# Patient Record
Sex: MALE | Race: BLACK OR AFRICAN AMERICAN | NOT HISPANIC OR LATINO | Employment: FULL TIME | URBAN - METROPOLITAN AREA
[De-identification: names, ages, dates, MRNs, and addresses within clinical notes are randomized per-mention and may not be internally consistent; named-entity substitution may affect disease eponyms.]

---

## 2020-10-23 ENCOUNTER — LAB VISIT (OUTPATIENT)
Dept: LAB | Facility: HOSPITAL | Age: 30
End: 2020-10-23
Attending: FAMILY MEDICINE
Payer: COMMERCIAL

## 2020-10-23 ENCOUNTER — OFFICE VISIT (OUTPATIENT)
Dept: INTERNAL MEDICINE | Facility: CLINIC | Age: 30
End: 2020-10-23
Payer: COMMERCIAL

## 2020-10-23 DIAGNOSIS — K21.9 GASTROESOPHAGEAL REFLUX DISEASE, UNSPECIFIED WHETHER ESOPHAGITIS PRESENT: ICD-10-CM

## 2020-10-23 DIAGNOSIS — Z00.00 ROUTINE ADULT HEALTH MAINTENANCE: ICD-10-CM

## 2020-10-23 DIAGNOSIS — F43.29 STRESS AND ADJUSTMENT REACTION: ICD-10-CM

## 2020-10-23 DIAGNOSIS — Z00.00 ROUTINE ADULT HEALTH MAINTENANCE: Primary | ICD-10-CM

## 2020-10-23 DIAGNOSIS — M72.2 PLANTAR FASCIA SYNDROME: ICD-10-CM

## 2020-10-23 LAB
ALBUMIN SERPL BCP-MCNC: 4.5 G/DL (ref 3.5–5.2)
ALP SERPL-CCNC: 69 U/L (ref 55–135)
ALT SERPL W/O P-5'-P-CCNC: 26 U/L (ref 10–44)
ANION GAP SERPL CALC-SCNC: 11 MMOL/L (ref 8–16)
AST SERPL-CCNC: 23 U/L (ref 10–40)
BASOPHILS # BLD AUTO: 0.04 K/UL (ref 0–0.2)
BASOPHILS NFR BLD: 0.3 % (ref 0–1.9)
BILIRUB SERPL-MCNC: 0.6 MG/DL (ref 0.1–1)
BUN SERPL-MCNC: 19 MG/DL (ref 6–20)
CALCIUM SERPL-MCNC: 9.7 MG/DL (ref 8.7–10.5)
CHLORIDE SERPL-SCNC: 103 MMOL/L (ref 95–110)
CHOLEST SERPL-MCNC: 230 MG/DL (ref 120–199)
CHOLEST/HDLC SERPL: 3.8 {RATIO} (ref 2–5)
CO2 SERPL-SCNC: 26 MMOL/L (ref 23–29)
CREAT SERPL-MCNC: 1.1 MG/DL (ref 0.5–1.4)
DIFFERENTIAL METHOD: ABNORMAL
EOSINOPHIL # BLD AUTO: 0 K/UL (ref 0–0.5)
EOSINOPHIL NFR BLD: 0.1 % (ref 0–8)
ERYTHROCYTE [DISTWIDTH] IN BLOOD BY AUTOMATED COUNT: 12.6 % (ref 11.5–14.5)
EST. GFR  (AFRICAN AMERICAN): >60 ML/MIN/1.73 M^2
EST. GFR  (NON AFRICAN AMERICAN): >60 ML/MIN/1.73 M^2
GLUCOSE SERPL-MCNC: 96 MG/DL (ref 70–110)
HCT VFR BLD AUTO: 48.4 % (ref 40–54)
HDLC SERPL-MCNC: 61 MG/DL (ref 40–75)
HDLC SERPL: 26.5 % (ref 20–50)
HGB BLD-MCNC: 15.9 G/DL (ref 14–18)
IMM GRANULOCYTES # BLD AUTO: 0.05 K/UL (ref 0–0.04)
IMM GRANULOCYTES NFR BLD AUTO: 0.4 % (ref 0–0.5)
LDLC SERPL CALC-MCNC: 155.2 MG/DL (ref 63–159)
LYMPHOCYTES # BLD AUTO: 3 K/UL (ref 1–4.8)
LYMPHOCYTES NFR BLD: 21.2 % (ref 18–48)
MCH RBC QN AUTO: 30.1 PG (ref 27–31)
MCHC RBC AUTO-ENTMCNC: 32.9 G/DL (ref 32–36)
MCV RBC AUTO: 92 FL (ref 82–98)
MONOCYTES # BLD AUTO: 1.2 K/UL (ref 0.3–1)
MONOCYTES NFR BLD: 8.5 % (ref 4–15)
NEUTROPHILS # BLD AUTO: 9.9 K/UL (ref 1.8–7.7)
NEUTROPHILS NFR BLD: 69.5 % (ref 38–73)
NONHDLC SERPL-MCNC: 169 MG/DL
NRBC BLD-RTO: 0 /100 WBC
PLATELET # BLD AUTO: 389 K/UL (ref 150–350)
PMV BLD AUTO: 10.8 FL (ref 9.2–12.9)
POTASSIUM SERPL-SCNC: 4.4 MMOL/L (ref 3.5–5.1)
PROT SERPL-MCNC: 8.1 G/DL (ref 6–8.4)
RBC # BLD AUTO: 5.29 M/UL (ref 4.6–6.2)
SODIUM SERPL-SCNC: 140 MMOL/L (ref 136–145)
TRIGL SERPL-MCNC: 69 MG/DL (ref 30–150)
WBC # BLD AUTO: 14.22 K/UL (ref 3.9–12.7)

## 2020-10-23 PROCEDURE — 80053 COMPREHEN METABOLIC PANEL: CPT

## 2020-10-23 PROCEDURE — 99999 PR PBB SHADOW E&M-NEW PATIENT-LVL III: CPT | Mod: PBBFAC,,, | Performed by: FAMILY MEDICINE

## 2020-10-23 PROCEDURE — 36415 COLL VENOUS BLD VENIPUNCTURE: CPT | Mod: PO

## 2020-10-23 PROCEDURE — 99203 OFFICE O/P NEW LOW 30 MIN: CPT | Mod: S$GLB,,, | Performed by: FAMILY MEDICINE

## 2020-10-23 PROCEDURE — 99203 PR OFFICE/OUTPT VISIT, NEW, LEVL III, 30-44 MIN: ICD-10-PCS | Mod: S$GLB,,, | Performed by: FAMILY MEDICINE

## 2020-10-23 PROCEDURE — 85025 COMPLETE CBC W/AUTO DIFF WBC: CPT

## 2020-10-23 PROCEDURE — 3008F PR BODY MASS INDEX (BMI) DOCUMENTED: ICD-10-PCS | Mod: CPTII,S$GLB,, | Performed by: FAMILY MEDICINE

## 2020-10-23 PROCEDURE — 99999 PR PBB SHADOW E&M-NEW PATIENT-LVL III: ICD-10-PCS | Mod: PBBFAC,,, | Performed by: FAMILY MEDICINE

## 2020-10-23 PROCEDURE — 3008F BODY MASS INDEX DOCD: CPT | Mod: CPTII,S$GLB,, | Performed by: FAMILY MEDICINE

## 2020-10-23 PROCEDURE — 80061 LIPID PANEL: CPT

## 2020-10-23 RX ORDER — MELOXICAM 15 MG/1
15 TABLET ORAL DAILY
Qty: 14 TABLET | Refills: 0 | Status: SHIPPED | OUTPATIENT
Start: 2020-10-23 | End: 2020-11-09 | Stop reason: SDUPTHER

## 2020-10-23 RX ORDER — ESCITALOPRAM OXALATE 10 MG/1
10 TABLET ORAL DAILY
Qty: 30 TABLET | Refills: 6 | Status: SHIPPED | OUTPATIENT
Start: 2020-10-23 | End: 2020-11-24

## 2020-10-26 ENCOUNTER — TELEPHONE (OUTPATIENT)
Dept: INTERNAL MEDICINE | Facility: CLINIC | Age: 30
End: 2020-10-26

## 2020-10-26 DIAGNOSIS — D72.829 LEUKOCYTOSIS, UNSPECIFIED TYPE: Primary | ICD-10-CM

## 2020-10-29 VITALS
BODY MASS INDEX: 26.25 KG/M2 | OXYGEN SATURATION: 99 % | DIASTOLIC BLOOD PRESSURE: 88 MMHG | WEIGHT: 177.25 LBS | TEMPERATURE: 99 F | SYSTOLIC BLOOD PRESSURE: 128 MMHG | HEART RATE: 81 BPM | HEIGHT: 69 IN

## 2020-10-29 NOTE — PROGRESS NOTES
Subjective:      Patient ID: Steven Thompson Jr. is a 30 y.o. male.    Chief Complaint: Foot Pain      Patient here for routine physical as well as to establish care.  He reports pain in both heels, this has been going on for a long time now, reports earlier this week had injections an urgent care clinic which did help, diagnosed with plantar fasciitis.    Reports increased stressors recently both at home and at work, finds he is exercising less recently as well, having difficulty sleeping, feeling overwhelmed, increased irritability, withdrawing his usual activities.  He denies any dysphoria.  Also reports reflux    Foot Pain  Associated symptoms include abdominal pain and arthralgias. Pertinent negatives include no fatigue, fever or rash.     Review of Systems   Constitutional: Negative for activity change, appetite change, fatigue and fever.   Respiratory: Negative for stridor.    Cardiovascular: Negative for palpitations and leg swelling.   Gastrointestinal: Positive for abdominal pain. Negative for constipation and diarrhea.   Musculoskeletal: Positive for arthralgias and gait problem.   Skin: Negative for color change and rash.   Psychiatric/Behavioral: Positive for decreased concentration and sleep disturbance. Negative for dysphoric mood. The patient is nervous/anxious.      History reviewed. No pertinent past medical history.       History reviewed. No pertinent surgical history.  History reviewed. No pertinent family history.  Social History     Socioeconomic History    Marital status: Single     Spouse name: Not on file    Number of children: Not on file    Years of education: Not on file    Highest education level: Not on file   Occupational History    Not on file   Social Needs    Financial resource strain: Not on file    Food insecurity     Worry: Not on file     Inability: Not on file    Transportation needs     Medical: Not on file     Non-medical: Not on file   Tobacco Use    Smoking  "status: Current Every Day Smoker    Smokeless tobacco: Never Used   Substance and Sexual Activity    Alcohol use: Not Currently    Drug use: Not Currently    Sexual activity: Yes     Partners: Female   Lifestyle    Physical activity     Days per week: Not on file     Minutes per session: Not on file    Stress: Not on file   Relationships    Social connections     Talks on phone: Not on file     Gets together: Not on file     Attends Congregation service: Not on file     Active member of club or organization: Not on file     Attends meetings of clubs or organizations: Not on file     Relationship status: Not on file   Other Topics Concern    Not on file   Social History Narrative    Not on file     Review of patient's allergies indicates:  No Known Allergies    Objective:       /88   Pulse 81   Temp 99.3 °F (37.4 °C) (Tympanic)   Ht 5' 9" (1.753 m)   Wt 80.4 kg (177 lb 4 oz)   SpO2 99%   BMI 26.18 kg/m²   Physical Exam  Vitals signs and nursing note reviewed.   Constitutional:       General: He is not in acute distress.     Appearance: Normal appearance. He is well-developed. He is not ill-appearing or diaphoretic.   HENT:      Head: Normocephalic.      Right Ear: Hearing, tympanic membrane, ear canal and external ear normal.      Left Ear: Hearing, tympanic membrane, ear canal and external ear normal.      Nose: Nose normal.      Right Sinus: No maxillary sinus tenderness or frontal sinus tenderness.      Left Sinus: No maxillary sinus tenderness or frontal sinus tenderness.      Mouth/Throat:      Pharynx: Uvula midline. No oropharyngeal exudate.   Eyes:      Conjunctiva/sclera: Conjunctivae normal.      Pupils: Pupils are equal, round, and reactive to light.   Neck:      Musculoskeletal: Normal range of motion and neck supple.   Cardiovascular:      Rate and Rhythm: Normal rate and regular rhythm.   Pulmonary:      Effort: Pulmonary effort is normal. No respiratory distress.      Breath sounds: " Normal breath sounds.   Abdominal:      General: Bowel sounds are normal.      Palpations: Abdomen is soft.      Tenderness: There is no abdominal tenderness. There is no guarding.      Hernia: No hernia is present.   Musculoskeletal: Normal range of motion.      Comments: Low arches, tenderness at insertion of plantar fascia   Skin:     General: Skin is warm and dry.      Capillary Refill: Capillary refill takes less than 2 seconds.   Neurological:      General: No focal deficit present.      Mental Status: He is alert and oriented to person, place, and time.   Psychiatric:         Mood and Affect: Mood normal.         Behavior: Behavior normal.         Thought Content: Thought content normal.         Judgment: Judgment normal.       Assessment:     1. Routine adult health maintenance    2. Gastroesophageal reflux disease, unspecified whether esophagitis present    3. Stress and adjustment reaction    4. Plantar fascia syndrome      Plan:   Routine adult health maintenance  -     Comprehensive Metabolic Panel; Future; Expected date: 04/21/2021  -     CBC auto differential; Future; Expected date: 04/21/2021  -     Lipid Panel; Future; Expected date: 10/23/2020    Gastroesophageal reflux disease, unspecified whether esophagitis present    Stress and adjustment reaction    Plantar fascia syndrome    Other orders  -     escitalopram oxalate (LEXAPRO) 10 MG tablet; Take 1 tablet (10 mg total) by mouth once daily.  Dispense: 30 tablet; Refill: 6  -     meloxicam (MOBIC) 15 MG tablet; Take 1 tablet (15 mg total) by mouth once daily.  Dispense: 14 tablet; Refill: 0     Gave stretching exercises plantar fasciitis.  He will if pain persists.  Is to follow-up 2 weeks for follow-up on new medication Lexapro  Medication List with Changes/Refills   New Medications    ESCITALOPRAM OXALATE (LEXAPRO) 10 MG TABLET    Take 1 tablet (10 mg total) by mouth once daily.    MELOXICAM (MOBIC) 15 MG TABLET    Take 1 tablet (15 mg total) by  mouth once daily.

## 2020-11-05 ENCOUNTER — LAB VISIT (OUTPATIENT)
Dept: LAB | Facility: HOSPITAL | Age: 30
End: 2020-11-05
Attending: FAMILY MEDICINE
Payer: COMMERCIAL

## 2020-11-05 DIAGNOSIS — D72.829 LEUKOCYTOSIS, UNSPECIFIED TYPE: ICD-10-CM

## 2020-11-05 LAB
BASOPHILS # BLD AUTO: 0.06 K/UL (ref 0–0.2)
BASOPHILS NFR BLD: 0.4 % (ref 0–1.9)
DIFFERENTIAL METHOD: ABNORMAL
EOSINOPHIL # BLD AUTO: 0.1 K/UL (ref 0–0.5)
EOSINOPHIL NFR BLD: 0.7 % (ref 0–8)
ERYTHROCYTE [DISTWIDTH] IN BLOOD BY AUTOMATED COUNT: 12.9 % (ref 11.5–14.5)
HCT VFR BLD AUTO: 44.2 % (ref 40–54)
HGB BLD-MCNC: 14.4 G/DL (ref 14–18)
IMM GRANULOCYTES # BLD AUTO: 0.06 K/UL (ref 0–0.04)
IMM GRANULOCYTES NFR BLD AUTO: 0.4 % (ref 0–0.5)
LYMPHOCYTES # BLD AUTO: 3.3 K/UL (ref 1–4.8)
LYMPHOCYTES NFR BLD: 24.4 % (ref 18–48)
MCH RBC QN AUTO: 30 PG (ref 27–31)
MCHC RBC AUTO-ENTMCNC: 32.6 G/DL (ref 32–36)
MCV RBC AUTO: 92 FL (ref 82–98)
MONOCYTES # BLD AUTO: 1 K/UL (ref 0.3–1)
MONOCYTES NFR BLD: 7.4 % (ref 4–15)
NEUTROPHILS # BLD AUTO: 9 K/UL (ref 1.8–7.7)
NEUTROPHILS NFR BLD: 66.7 % (ref 38–73)
NRBC BLD-RTO: 0 /100 WBC
PLATELET # BLD AUTO: 394 K/UL (ref 150–350)
PMV BLD AUTO: 10.2 FL (ref 9.2–12.9)
RBC # BLD AUTO: 4.8 M/UL (ref 4.6–6.2)
WBC # BLD AUTO: 13.55 K/UL (ref 3.9–12.7)

## 2020-11-05 PROCEDURE — 36415 COLL VENOUS BLD VENIPUNCTURE: CPT | Mod: PO

## 2020-11-05 PROCEDURE — 85025 COMPLETE CBC W/AUTO DIFF WBC: CPT

## 2020-11-06 ENCOUNTER — TELEPHONE (OUTPATIENT)
Dept: INTERNAL MEDICINE | Facility: CLINIC | Age: 30
End: 2020-11-06

## 2020-11-06 DIAGNOSIS — D72.829 LEUKOCYTOSIS, UNSPECIFIED TYPE: Primary | ICD-10-CM

## 2020-11-06 NOTE — TELEPHONE ENCOUNTER
----- Message from Nusrat Carrillo MD sent at 11/6/2020  4:15 PM CST -----  Please notify his white count is still elevated but improved. I wanted to recheck again in 2 more weeks.

## 2020-11-06 NOTE — TELEPHONE ENCOUNTER
SPOKE WITH PT INFORM OF LAB RESULTS REMAIN UP BUT IMPROVING; PT VERBALIZED UNDERSTANDING AND AGREED TO f/u LAB APPT ON 11/20/20 ; APPT BOOKED

## 2020-11-09 ENCOUNTER — HOSPITAL ENCOUNTER (EMERGENCY)
Facility: HOSPITAL | Age: 30
Discharge: HOME | End: 2020-11-09
Attending: EMERGENCY MEDICINE
Payer: COMMERCIAL

## 2020-11-09 ENCOUNTER — OFFICE VISIT (OUTPATIENT)
Dept: INTERNAL MEDICINE | Facility: CLINIC | Age: 30
End: 2020-11-09
Payer: COMMERCIAL

## 2020-11-09 ENCOUNTER — OFFICE VISIT (OUTPATIENT)
Dept: PODIATRY | Facility: CLINIC | Age: 30
End: 2020-11-09
Payer: COMMERCIAL

## 2020-11-09 ENCOUNTER — HOSPITAL ENCOUNTER (OUTPATIENT)
Dept: RADIOLOGY | Facility: HOSPITAL | Age: 30
Discharge: HOME | End: 2020-11-09
Attending: FAMILY MEDICINE
Payer: COMMERCIAL

## 2020-11-09 VITALS
BODY MASS INDEX: 27.3 KG/M2 | WEIGHT: 184.31 LBS | OXYGEN SATURATION: 99 % | DIASTOLIC BLOOD PRESSURE: 87 MMHG | HEART RATE: 78 BPM | RESPIRATION RATE: 16 BRPM | SYSTOLIC BLOOD PRESSURE: 131 MMHG | HEIGHT: 69 IN | TEMPERATURE: 99 F

## 2020-11-09 VITALS
TEMPERATURE: 98 F | BODY MASS INDEX: 27.62 KG/M2 | WEIGHT: 186.5 LBS | HEART RATE: 71 BPM | RESPIRATION RATE: 20 BRPM | OXYGEN SATURATION: 98 % | HEIGHT: 69 IN | DIASTOLIC BLOOD PRESSURE: 84 MMHG | SYSTOLIC BLOOD PRESSURE: 130 MMHG

## 2020-11-09 DIAGNOSIS — M76.822 POSTERIOR TIBIAL TENDON DYSFUNCTION (PTTD) OF BOTH LOWER EXTREMITIES: Primary | ICD-10-CM

## 2020-11-09 DIAGNOSIS — M76.61 ACHILLES TENDINITIS OF RIGHT LOWER EXTREMITY: ICD-10-CM

## 2020-11-09 DIAGNOSIS — M21.42 PES PLANUS OF BOTH FEET: ICD-10-CM

## 2020-11-09 DIAGNOSIS — M72.2 PLANTAR FASCIITIS: ICD-10-CM

## 2020-11-09 DIAGNOSIS — M76.60 ACHILLES TENDINITIS, UNSPECIFIED LATERALITY: ICD-10-CM

## 2020-11-09 DIAGNOSIS — M21.41 PES PLANUS OF BOTH FEET: ICD-10-CM

## 2020-11-09 DIAGNOSIS — M76.821 POSTERIOR TIBIAL TENDON DYSFUNCTION (PTTD) OF BOTH LOWER EXTREMITIES: Primary | ICD-10-CM

## 2020-11-09 DIAGNOSIS — M76.61 ACHILLES TENDINITIS OF RIGHT LOWER EXTREMITY: Primary | ICD-10-CM

## 2020-11-09 DIAGNOSIS — M72.2 PLANTAR FASCIA SYNDROME: Primary | ICD-10-CM

## 2020-11-09 PROCEDURE — 99213 OFFICE O/P EST LOW 20 MIN: CPT | Mod: S$GLB,,, | Performed by: FAMILY MEDICINE

## 2020-11-09 PROCEDURE — 99213 PR OFFICE/OUTPT VISIT, EST, LEVL III, 20-29 MIN: ICD-10-PCS | Mod: S$GLB,,, | Performed by: FAMILY MEDICINE

## 2020-11-09 PROCEDURE — 99999 PR PBB SHADOW E&M-EST. PATIENT-LVL III: CPT | Mod: PBBFAC,,, | Performed by: PODIATRIST

## 2020-11-09 PROCEDURE — 99999 PR PBB SHADOW E&M-EST. PATIENT-LVL IV: CPT | Mod: PBBFAC,,, | Performed by: FAMILY MEDICINE

## 2020-11-09 PROCEDURE — 99283 EMERGENCY DEPT VISIT LOW MDM: CPT | Mod: 25

## 2020-11-09 PROCEDURE — 99243 PR OFFICE CONSULTATION,LEVEL III: ICD-10-PCS | Mod: S$GLB,,, | Performed by: PODIATRIST

## 2020-11-09 PROCEDURE — 99999 PR PBB SHADOW E&M-EST. PATIENT-LVL IV: ICD-10-PCS | Mod: PBBFAC,,, | Performed by: FAMILY MEDICINE

## 2020-11-09 PROCEDURE — 3008F PR BODY MASS INDEX (BMI) DOCUMENTED: ICD-10-PCS | Mod: CPTII,S$GLB,, | Performed by: FAMILY MEDICINE

## 2020-11-09 PROCEDURE — 73630 X-RAY EXAM OF FOOT: CPT | Mod: TC,PO,RT

## 2020-11-09 PROCEDURE — 99999 PR PBB SHADOW E&M-EST. PATIENT-LVL III: ICD-10-PCS | Mod: PBBFAC,,, | Performed by: PODIATRIST

## 2020-11-09 PROCEDURE — 99243 OFF/OP CNSLTJ NEW/EST LOW 30: CPT | Mod: S$GLB,,, | Performed by: PODIATRIST

## 2020-11-09 PROCEDURE — 73630 X-RAY EXAM OF FOOT: CPT | Mod: 26,RT,, | Performed by: RADIOLOGY

## 2020-11-09 PROCEDURE — 73630 XR FOOT COMPLETE 3 VIEW RIGHT: ICD-10-PCS | Mod: 26,RT,, | Performed by: RADIOLOGY

## 2020-11-09 PROCEDURE — 3008F BODY MASS INDEX DOCD: CPT | Mod: CPTII,S$GLB,, | Performed by: FAMILY MEDICINE

## 2020-11-09 RX ORDER — HYDROCODONE BITARTRATE AND ACETAMINOPHEN 5; 325 MG/1; MG/1
1 TABLET ORAL EVERY 4 HOURS PRN
Qty: 11 TABLET | Refills: 0 | Status: SHIPPED | OUTPATIENT
Start: 2020-11-09 | End: 2020-11-14

## 2020-11-09 RX ORDER — MELOXICAM 15 MG/1
15 TABLET ORAL DAILY
Qty: 14 TABLET | Refills: 0 | Status: SHIPPED | OUTPATIENT
Start: 2020-11-09 | End: 2020-12-09 | Stop reason: ALTCHOICE

## 2020-11-09 NOTE — PROGRESS NOTES
Subjective:      Patient ID: Steven Thompson Jr. is a 30 y.o. male.    Chief Complaint: Patient in with a complaint of pain in his feet (Patient states he wants to see if he can get a referral to see a foot dr and states he has knot on the back of his right foot. Patient states he was seen at [facility] on [address] this am . )      Patient reports recent significant worsening of pain in his feet, right worse than left.  He completed NSAID prescribed last visit.  He reports he went to the ER this morning because the pain was so severe.  He is requesting referral to podiatrist.  Reports anxiety also also addressed last visit unchanged, took Lexapro only 1 day did not like how it made him feel    Review of Systems   Musculoskeletal: Positive for arthralgias and gait problem.   Psychiatric/Behavioral: The patient is nervous/anxious.      History reviewed. No pertinent past medical history.       History reviewed. No pertinent surgical history.  History reviewed. No pertinent family history.  Social History     Socioeconomic History    Marital status: Single     Spouse name: Not on file    Number of children: Not on file    Years of education: Not on file    Highest education level: Not on file   Occupational History    Not on file   Social Needs    Financial resource strain: Not on file    Food insecurity     Worry: Not on file     Inability: Not on file    Transportation needs     Medical: Not on file     Non-medical: Not on file   Tobacco Use    Smoking status: Current Every Day Smoker     Types: Cigarettes    Smokeless tobacco: Never Used   Substance and Sexual Activity    Alcohol use: Not Currently    Drug use: Not Currently    Sexual activity: Yes     Partners: Female   Lifestyle    Physical activity     Days per week: Not on file     Minutes per session: Not on file    Stress: Not on file   Relationships    Social connections     Talks on phone: Not on file     Gets together: Not on file     Attends  "Scientologist service: Not on file     Active member of club or organization: Not on file     Attends meetings of clubs or organizations: Not on file     Relationship status: Not on file   Other Topics Concern    Not on file   Social History Narrative    Not on file     Review of patient's allergies indicates:  No Known Allergies    Objective:       /84   Pulse 71   Temp 97.6 °F (36.4 °C)   Resp 20   Ht 5' 9" (1.753 m)   Wt 84.6 kg (186 lb 8.2 oz)   SpO2 98%   BMI 27.54 kg/m²   Physical Exam  Constitutional:       General: He is not in acute distress.     Appearance: Normal appearance. He is well-developed. He is not ill-appearing or diaphoretic.   Musculoskeletal:         General: Tenderness present.      Comments: Right foot:  Tender at insertion of plantar fascia, on Achilles tendon   Left foot:  Tenderness at insertion of plantar fascia  Low arches bilaterally   Skin:     Capillary Refill: Capillary refill takes less than 2 seconds.   Neurological:      General: No focal deficit present.      Mental Status: He is alert and oriented to person, place, and time.   Psychiatric:         Mood and Affect: Mood normal.         Behavior: Behavior normal.         Thought Content: Thought content normal.         Judgment: Judgment normal.       Assessment:     1. Plantar fascia syndrome    2. Achilles tendinitis, unspecified laterality      Plan:   Plantar fascia syndrome  -     Ambulatory referral/consult to Podiatry; Future; Expected date: 11/16/2020    Achilles tendinitis, unspecified laterality  -     Ambulatory referral/consult to Podiatry; Future; Expected date: 11/16/2020  -     X-Ray Foot Complete 3 view Right; Future; Expected date: 11/09/2020    Other orders  -     meloxicam (MOBIC) 15 MG tablet; Take 1 tablet (15 mg total) by mouth once daily.  Dispense: 14 tablet; Refill: 0    xray - NAF  will extend meloxicam  Medication List with Changes/Refills   Current Medications    ESCITALOPRAM OXALATE " (LEXAPRO) 10 MG TABLET    Take 1 tablet (10 mg total) by mouth once daily.    HYDROCODONE-ACETAMINOPHEN (NORCO) 5-325 MG PER TABLET    Take 1 tablet by mouth every 4 (four) hours as needed for Pain.   Changed and/or Refilled Medications    Modified Medication Previous Medication    MELOXICAM (MOBIC) 15 MG TABLET meloxicam (MOBIC) 15 MG tablet       Take 1 tablet (15 mg total) by mouth once daily.    Take 1 tablet (15 mg total) by mouth once daily.

## 2020-11-09 NOTE — LETTER
November 9, 2020      Nusrat Carrillo MD  [address] [address] [address]  Phone: [phone]  Fax: [phone]          Patient: Steven Thompson Jr.   MR Number: [id number]   YOB: 1990   Date of Visit: 11/9/2020       Dear Dr. Nusrat Carrillo:    Thank you for referring Steven Thompson to me for evaluation. Attached you will find relevant portions of my assessment and plan of care.    If you have questions, please do not hesitate to call me. I look forward to following Steven Thompson along with you.    Sincerely,    Ivy Adam, SUSHILA    Enclosure  CC:  No Recipients    If you would like to receive this communication electronically, please contact [email or web address] or [phone] to request more information on [employer] access.    For providers and/or their staff who would like to refer a patient to [facility], please contact us through our one-stop-shop provider referral line, [facility], at [phone].    If you feel you have received this communication in error or would no longer like to receive these types of communications, please e-mail [email or web address]

## 2020-11-09 NOTE — ED PROVIDER NOTES
SCRIBE #1 NOTE: I, Jimbo Rika, am scribing for, and in the presence of, Yordy Lezama MD. I have scribed the entire note.      History      Chief Complaint   Patient presents with    Foot Pain     bilateral foot pain, has seen MD and told he had planters fasciitis and given medication, but states pain is still there.       Review of patient's allergies indicates:  No Known Allergies     HPI   HPI    11/9/2020, 7:59 AM   History obtained from the patient      History of Present Illness: Steven Thompson Jr. is a 30 y.o. male patient who presents to the Emergency Department for bilateral foot pain, onset 1.5 months PTA. Pt recently presented to [facility], was dx with plantar fasciitis, and started on Meloxicam 15 mg. Symptoms are constant and moderate in severity. No mitigating or exacerbating factors reported. No associated sxs reported. Patient denies any fever, chills, n/v/d, SOB, CP, weakness, numbness, dizziness, headache, and all other sxs at this time. Pt has not yet followed with Podiatry. No further complaints or concerns at this time.     Arrival mode: Personal vehicle    PCP: Nusrat Carrillo MD       Past Medical History:  No past medical history on file.    Past Surgical History:  No past surgical history on file.      Family History:  No family history on file.    Social History:  Social History     Tobacco Use    Smoking status: Current Every Day Smoker    Smokeless tobacco: Never Used   Substance and Sexual Activity    Alcohol use: Not Currently    Drug use: Not Currently    Sexual activity: Yes     Partners: Female       ROS   Review of Systems   Constitutional: Negative for chills and fever.   HENT: Negative for sore throat.    Respiratory: Negative for shortness of breath.    Cardiovascular: Negative for chest pain.   Gastrointestinal: Negative for diarrhea, nausea and vomiting.   Genitourinary: Negative for dysuria.   Musculoskeletal: Positive for myalgias (bilateral  "feet). Negative for back pain.   Skin: Negative for rash.   Neurological: Negative for dizziness, seizures, weakness, light-headedness, numbness and headaches.   Hematological: Does not bruise/bleed easily.   All other systems reviewed and are negative.    Physical Exam      Initial Vitals [11/09/20 0744]   BP Pulse Resp Temp SpO2   131/87 78 16 98.6 °F (37 °C) 99 %      MAP       --          Physical Exam  Nursing Notes and Vital Signs Reviewed.  Constitutional: Patient is in no acute distress. Well-developed and well-nourished.  Head: Atraumatic. Normocephalic.  Eyes: PERRL. EOM intact. Conjunctivae are not pale. No scleral icterus.  ENT: Mucous membranes are moist. Oropharynx is clear and symmetric.    Neck: Supple. Full ROM. No lymphadenopathy.  Cardiovascular: Regular rate. Regular rhythm. No murmurs, rubs, or gallops. Distal pulses are 2+ and symmetric.  Pulmonary/Chest: No respiratory distress. Clear to auscultation bilaterally. No wheezing or rales.  Abdominal: Soft and non-distended.  There is no tenderness.  No rebound, guarding, or rigidity.   Musculoskeletal: Moves all extremities. No obvious deformities. R Achilles tendon swelling with TTP. Intact.  Tenderness along the bilateral plantar fascia  Skin: Warm and dry.  Neurological:  Alert, awake, and appropriate.  Normal speech.  No acute focal neurological deficits are appreciated.  Psychiatric: Normal affect. Good eye contact. Appropriate in content.    ED Course    Procedures  ED Vital Signs:  Vitals:    11/09/20 0744   BP: 131/87   Pulse: 78   Resp: 16   Temp: 98.6 °F (37 °C)   TempSrc: Oral   SpO2: 99%   Weight: 83.6 kg (184 lb 4.9 oz)   Height: 5' 9" (1.753 m)            The Emergency Provider reviewed the vital signs and test results, which are outlined above.    ED Discussion     8:01 AM: Discussed with pt all pertinent ED information. Discussed pt dx and plan of tx. Gave pt all f/u and return to the ED instructions. All questions and concerns " were addressed at this time. Pt expresses understanding of information and instructions, and is comfortable with plan to discharge. Pt is stable for discharge.    I discussed with patient and/or family/caretaker that evaluation in the ED does not suggest any emergent or life threatening medical conditions requiring immediate intervention beyond what was provided in the ED, and I believe patient is safe for discharge.  Regardless, an unremarkable evaluation in the ED does not preclude the development or presence of a serious of life threatening condition. As such, patient was instructed to return immediately for any worsening or change in current symptoms.         ED Medication(s):  Medications - No data to display    Follow-up Information     Nusrat Carrillo MD.    Specialty: Internal Medicine  Contact information:  [address] [facility] [id number] [phone]                  New Prescriptions    HYDROCODONE-ACETAMINOPHEN (NORCO) 5-325 MG PER TABLET    Take 1 tablet by mouth every 4 (four) hours as needed for Pain.         Medical Decision Making              Scribe Attestation:   Scribe #1: I performed the above scribed service and the documentation accurately describes the services I performed. I attest to the accuracy of the note.    Attending:   Physician Attestation Statement for Scribe #1: I, Yordy Lezama MD, personally performed the services described in this documentation, as scribed by Jimbo Meléndez, in my presence, and it is both accurate and complete.          Clinical Impression       ICD-10-CM ICD-9-CM   1. Achilles tendinitis of right lower extremity  M76.61 726.71   2. Plantar fasciitis  M72.2 728.71       Disposition:   Disposition: Discharged  Condition: Stable         Yordy Lezama MD  11/09/20 [id number]

## 2020-11-09 NOTE — PROGRESS NOTES
Subjective:       Patient ID: Steven Thompson Jr. is a 30 y.o. male.    Chief Complaint: Foot Pain (bilateral pain to feet, 8/10 at present. Patient states it started since August 2020. He is wearing socks and tennis shoes. He is non diabetic)      HPI: Steven Thompson Jr. presents to the office with the chief complaint of pains to the left and right foot and ankle at the medial aspect.  Patient also complains of posterior ankle pain at the heel.  The pains are rated as 8/10 and are described as sore in nature. The pains have been on going now for the past several months and are worsening. The patient denies trauma. The patient states recent use of NSAIDs for management. The patient states that prolonged walking and standing exacerbates and causes the symptoms. The patient does state mild associated swelling as well. Patient's Primary Care Provider is Nusrat Carrillo MD.     Review of patient's allergies indicates:  No Known Allergies    History reviewed. No pertinent past medical history.    History reviewed. No pertinent family history.    Social History     Socioeconomic History    Marital status: Single     Spouse name: Not on file    Number of children: Not on file    Years of education: Not on file    Highest education level: Not on file   Occupational History    Not on file   Social Needs    Financial resource strain: Not on file    Food insecurity     Worry: Not on file     Inability: Not on file    Transportation needs     Medical: Not on file     Non-medical: Not on file   Tobacco Use    Smoking status: Current Every Day Smoker     Types: Cigarettes    Smokeless tobacco: Never Used   Substance and Sexual Activity    Alcohol use: Not Currently    Drug use: Not Currently    Sexual activity: Yes     Partners: Female   Lifestyle    Physical activity     Days per week: Not on file     Minutes per session: Not on file    Stress: Not on file   Relationships    Social connections      Talks on phone: Not on file     Gets together: Not on file     Attends Latter day service: Not on file     Active member of club or organization: Not on file     Attends meetings of clubs or organizations: Not on file     Relationship status: Not on file   Other Topics Concern    Not on file   Social History Narrative    Not on file       History reviewed. No pertinent surgical history.    Review of Systems   Constitutional: Negative for activity change, appetite change, chills and fever.   HENT: Negative for sinus pain, sore throat and voice change.    Eyes: Negative for pain, redness and visual disturbance.   Respiratory: Negative for cough and shortness of breath.    Cardiovascular: Negative for chest pain and palpitations.   Gastrointestinal: Negative for diarrhea, nausea and vomiting.   Musculoskeletal: Negative for back pain and joint swelling.   Skin: Negative for color change and wound.   Neurological: Negative for dizziness, weakness and numbness.   Psychiatric/Behavioral: The patient is not nervous/anxious.          Objective:   There were no vitals taken for this visit.    X-Ray Foot Complete 3 view Right  Narrative: EXAMINATION:  XR FOOT COMPLETE 3 VIEW RIGHT    CLINICAL HISTORY:  . Achilles tendinitis, unspecified leg    TECHNIQUE:  AP, lateral, and oblique views of the foot were performed.    COMPARISON:  None    FINDINGS:  There is no evidence to suggest acute fracture or dislocation.  The joint spaces appear to be well maintained.  A pes planus deformity is suspected even though the patient is nonweightbearing.  Impression: As above    Electronically signed by: Saul Montalvo DO  Date:    11/09/2020  Time:    11:02      Physical Exam    LOWER EXTREMITY PHYSICAL EXAMINATION    VASCULAR: Pulses are palpable to the B/L lower extremity. The right dorsalis pedis pulse is 2/4 and the posterior tibial pulse is 2/4. The left dorsalis pedis pulse is 2/4 and the posterior tibial pulse is 2/4. Hair growth is  noted on the dorsal foot and digits. Proximal to distal, warm to warm. Capillary refill time is WNL at less than 3s.    DERMATOLOGY: Skin is supple, dry and intact. No ecchymosis is noted. No hypertrophic skin formation. No erythema or cellulitis is noted.     ORTHOPEDIC: There is moderate collapsing pes planovalgus on the left and right foot. There is moderate tenderness to palpation of the navicular tuberosity on the left or right foot. There is no edema noted along the course of the PT tendon on the left or right foot. There is no retro-malleolar edema (medial malleolus). There is mild pain to palpation at the spring ligament and the deltoid ligaments. There is no apparent pains to palpation of the medial malleolus.  Equinus contracture is noted.  No pain with palpation and/or range of motion of the ankle joint.  There is no crepitus noted with range of motion of the ankle joint. The ankle is not in valgus.  There is no limitation to ROM of the STJ and the MTJ. The hindfoot is in valgus. Upon standing, there is moderate tommie-talar subluxation noted on the left foot. There is moderate forefoot/midfoot abduction on the hindfoot.  RCSP is valgus. The deformity is supple. The patient is able to double heel rise, but has difficulties with single heel rise on the left foot. Gait pattern is antalgic at this time.    NEUROLOGY: Protective sensation is intact via 5.07 Lucas Mert monofilament. Proprioception is intact. Sensation to light touch is intact. Upon palpation of the interspaces, there are no neurological sensations stated that radiate proximal or distal. Upon compression of the metatarsal heads from medial to lateral, no neurological sensations or symptoms are stated.    Assessment:     1. Posterior tibial tendon dysfunction (PTTD) of both lower extremities    2. Achilles tendinitis of right lower extremity    3. Pes planus of both feet        Plan:     Posterior tibial tendon dysfunction (PTTD) of both  lower extremities  -     Ambulatory referral/consult to Podiatry    Achilles tendinitis of right lower extremity  -     Ambulatory referral/consult to Podiatry    Pes planus of both feet        Insert plan discussion.  X-rays reviewed by myself with the patient the room.  Unfortunately, these are nonweightbearing x-rays as they provide little information about the patient's weight-bearing status or the ability to determine the degree a pes planus.  Patient also complaining of pain to the posterior aspect of the heel as well.  Discussed Achilles tendinitis.  Discussed the importance of stretching 4-6 times per day with a stretching band.  This will help alleviate tension being applied to the calcaneus where the Achilles tendon inserts.  Patient may also benefit from anti-inflammatories as previously prescribed by PCP.    Discussed pathology in etiology associated pes planus.  As the foot does tend to pronate, there is excessive tension being applied to the posterior tibial tendon medially which causes pain to the plantar aspect of foot.  Recommend patient consider over-the-counter orthotic therapy.  A prescription was provided to the patient for orthotics.  We discussed the importance of wearing the orthotics to help elevate the arch which will allow for tension to be lessened to the plantar fascia and posterior heel cord.  Discussed the importance of the break-in period with the inserts by wearing them 2-3 hours for the 1st day and increasing their use an hour each day until able to tolerate a full work period.    Also discussed possible surgical intervention if conservative options do not reduce patient's symptoms.  Will continue with conservative management thus far.    Patient follow-up 1.5 months      Future Appointments   Date Time Provider Department Center   11/20/2020  3:30 PM LABORATORY, [facility]   12/21/2020  2:40 PM Ivy Adam DPM [facility]

## 2020-11-09 NOTE — Clinical Note
"Steven"Paula Thompson was seen and treated in our emergency department on 11/9/2020.  He may return to work on 11/10/2020.       If you have any questions or concerns, please don't hesitate to call.      Yordy Lezama MD"

## 2020-11-20 ENCOUNTER — LAB VISIT (OUTPATIENT)
Dept: LAB | Facility: HOSPITAL | Age: 30
End: 2020-11-20
Attending: FAMILY MEDICINE
Payer: COMMERCIAL

## 2020-11-20 DIAGNOSIS — D72.829 LEUKOCYTOSIS, UNSPECIFIED TYPE: ICD-10-CM

## 2020-11-20 LAB
BASOPHILS # BLD AUTO: 0.06 K/UL (ref 0–0.2)
BASOPHILS NFR BLD: 0.6 % (ref 0–1.9)
DIFFERENTIAL METHOD: ABNORMAL
EOSINOPHIL # BLD AUTO: 0.2 K/UL (ref 0–0.5)
EOSINOPHIL NFR BLD: 1.8 % (ref 0–8)
ERYTHROCYTE [DISTWIDTH] IN BLOOD BY AUTOMATED COUNT: 12.8 % (ref 11.5–14.5)
HCT VFR BLD AUTO: 40.5 % (ref 40–54)
HGB BLD-MCNC: 13.4 G/DL (ref 14–18)
IMM GRANULOCYTES # BLD AUTO: 0.03 K/UL (ref 0–0.04)
IMM GRANULOCYTES NFR BLD AUTO: 0.3 % (ref 0–0.5)
LYMPHOCYTES # BLD AUTO: 2.9 K/UL (ref 1–4.8)
LYMPHOCYTES NFR BLD: 27.4 % (ref 18–48)
MCH RBC QN AUTO: 30 PG (ref 27–31)
MCHC RBC AUTO-ENTMCNC: 33.1 G/DL (ref 32–36)
MCV RBC AUTO: 91 FL (ref 82–98)
MONOCYTES # BLD AUTO: 0.7 K/UL (ref 0.3–1)
MONOCYTES NFR BLD: 6.8 % (ref 4–15)
NEUTROPHILS # BLD AUTO: 6.7 K/UL (ref 1.8–7.7)
NEUTROPHILS NFR BLD: 63.1 % (ref 38–73)
NRBC BLD-RTO: 0 /100 WBC
PLATELET # BLD AUTO: 365 K/UL (ref 150–350)
PMV BLD AUTO: 11.1 FL (ref 9.2–12.9)
RBC # BLD AUTO: 4.46 M/UL (ref 4.6–6.2)
WBC # BLD AUTO: 10.65 K/UL (ref 3.9–12.7)

## 2020-11-20 PROCEDURE — 85025 COMPLETE CBC W/AUTO DIFF WBC: CPT

## 2020-11-20 PROCEDURE — 36415 COLL VENOUS BLD VENIPUNCTURE: CPT | Mod: PO

## 2020-11-23 ENCOUNTER — TELEPHONE (OUTPATIENT)
Dept: INTERNAL MEDICINE | Facility: CLINIC | Age: 30
End: 2020-11-23

## 2020-11-23 NOTE — TELEPHONE ENCOUNTER
----- Message from Nusrat Carrillo MD sent at 11/21/2020  9:26 AM CST -----  Please let them know that his blood count back to normal.

## 2020-11-24 ENCOUNTER — OFFICE VISIT (OUTPATIENT)
Dept: INTERNAL MEDICINE | Facility: CLINIC | Age: 30
End: 2020-11-24
Payer: COMMERCIAL

## 2020-11-24 VITALS
WEIGHT: 181.69 LBS | TEMPERATURE: 98 F | BODY MASS INDEX: 26.91 KG/M2 | DIASTOLIC BLOOD PRESSURE: 82 MMHG | HEIGHT: 69 IN | SYSTOLIC BLOOD PRESSURE: 116 MMHG | HEART RATE: 78 BPM | OXYGEN SATURATION: 98 %

## 2020-11-24 DIAGNOSIS — M79.605 BILATERAL LEG PAIN: ICD-10-CM

## 2020-11-24 DIAGNOSIS — M79.604 BILATERAL LEG PAIN: ICD-10-CM

## 2020-11-24 DIAGNOSIS — M54.50 ACUTE LOW BACK PAIN, UNSPECIFIED BACK PAIN LATERALITY, UNSPECIFIED WHETHER SCIATICA PRESENT: Primary | ICD-10-CM

## 2020-11-24 PROCEDURE — 1125F PR PAIN SEVERITY QUANTIFIED, PAIN PRESENT: ICD-10-PCS | Mod: S$GLB,,, | Performed by: FAMILY MEDICINE

## 2020-11-24 PROCEDURE — 99999 PR PBB SHADOW E&M-EST. PATIENT-LVL IV: ICD-10-PCS | Mod: PBBFAC,,, | Performed by: FAMILY MEDICINE

## 2020-11-24 PROCEDURE — 3008F PR BODY MASS INDEX (BMI) DOCUMENTED: ICD-10-PCS | Mod: CPTII,S$GLB,, | Performed by: FAMILY MEDICINE

## 2020-11-24 PROCEDURE — 99213 OFFICE O/P EST LOW 20 MIN: CPT | Mod: S$GLB,,, | Performed by: FAMILY MEDICINE

## 2020-11-24 PROCEDURE — 1125F AMNT PAIN NOTED PAIN PRSNT: CPT | Mod: S$GLB,,, | Performed by: FAMILY MEDICINE

## 2020-11-24 PROCEDURE — 99999 PR PBB SHADOW E&M-EST. PATIENT-LVL IV: CPT | Mod: PBBFAC,,, | Performed by: FAMILY MEDICINE

## 2020-11-24 PROCEDURE — 3008F BODY MASS INDEX DOCD: CPT | Mod: CPTII,S$GLB,, | Performed by: FAMILY MEDICINE

## 2020-11-24 PROCEDURE — 99213 PR OFFICE/OUTPT VISIT, EST, LEVL III, 20-29 MIN: ICD-10-PCS | Mod: S$GLB,,, | Performed by: FAMILY MEDICINE

## 2020-11-24 RX ORDER — CYCLOBENZAPRINE HCL 10 MG
10 TABLET ORAL NIGHTLY
Qty: 30 TABLET | Refills: 1 | Status: SHIPPED | OUTPATIENT
Start: 2020-11-24 | End: 2020-12-04

## 2020-11-27 ENCOUNTER — CLINICAL SUPPORT (OUTPATIENT)
Dept: REHABILITATION | Facility: HOSPITAL | Age: 30
End: 2020-11-27
Payer: COMMERCIAL

## 2020-11-27 DIAGNOSIS — M79.604 BILATERAL LEG PAIN: ICD-10-CM

## 2020-11-27 DIAGNOSIS — M79.605 BILATERAL LEG PAIN: ICD-10-CM

## 2020-11-27 DIAGNOSIS — M72.2 PLANTAR FASCIITIS, BILATERAL: Primary | ICD-10-CM

## 2020-11-27 DIAGNOSIS — M54.50 ACUTE LOW BACK PAIN, UNSPECIFIED BACK PAIN LATERALITY, UNSPECIFIED WHETHER SCIATICA PRESENT: ICD-10-CM

## 2020-11-27 PROCEDURE — 97014 ELECTRIC STIMULATION THERAPY: CPT

## 2020-11-27 PROCEDURE — 97161 PT EVAL LOW COMPLEX 20 MIN: CPT

## 2020-11-27 PROCEDURE — 97110 THERAPEUTIC EXERCISES: CPT

## 2020-11-27 NOTE — PLAN OF CARE
[facility]  Physical Therapy Initial Evaluation    Name: Steven Thompson Jr.  Clinic Number: [id number]    Therapy Diagnosis:   Encounter Diagnoses   Name Primary?    Acute low back pain, unspecified back pain laterality, unspecified whether sciatica present     Bilateral leg pain      Physician: Nusrat Carrillo MD    Physician Orders: PT Eval and Treat    Medical Diagnosis from Referral:   M54.5 (ICD-10-CM) - Acute low back pain, unspecified back pain laterality, unspecified whether sciatica present   M79.604,M79.605 (ICD-10-CM) - Bilateral leg pain   Evaluation Date: 11/27/2020  Authorization Period Expiration: 12/31/2020  Plan of Care Expiration: 2/5/2021  Visit # / Visits authorized: 1/ 20    Time In: 7:55 am  Time Out: 9:00 am   Total Billable Time: 25 minutes    Precautions: Standard     Subjective   Date of onset: feet  X 3 months / back x 2 weeks    History of current condition - Steven reports: he originally was having bilateral plantar foot pain for 3 months. Saw doctor and had inserts prescribed . Was given stretch strap to stretch feet .He started getting lower back pain over the past few weeks - unsure if it is related to feet or coincidental.      Pain:  Current 2/10, worst 8/10, best 2/10   Location: bilateral lower lumbar spine - extends down legs .  Foot pain bilaterally plantar surface    Description: Aching and Variable  Aggravating Factors: back- more active / moving around  Foot pain - worse with increased time weight bearing   Easing Factors: lying down , rest     Prior Therapy: none - went to chiro a few days ago ( helped some)   Social History:   lives with their family  Occupation:    Prior Level of Function: independent   Current Level of Function: modified independent    Imaging, x-ray of feet : no evidence to suggest acute fracture or dislocation.  The joint spaces appear to be well maintained.     Medical History:   No past medical history on  file.    Surgical History:   Steven Thompson Jr.  has no past surgical history on file.    Medications:   Steven has a current medication list which includes the following prescription(s): cyclobenzaprine and meloxicam.    Allergies:   Review of patient's allergies indicates:  No Known Allergies     Pts goals: Decreased pain . Able to walk pain free.     Objective           Posture/Structure:  Level / symmetrical trunk , notable pes planus bilaterally , difficulty with heel raise     Gait: mild guarded posture and shortened stride from expected.     L/sp AROM:   % Pain Present (Y/N)   FB To ankle level  Y   RSB 75%    LSB 75% Y   BB 50% Y   RRot 80%    LRot 80% Y     Ankle ROM:  dorsiflexion :  0 active /5 passive deg    plantarflexion: 45 active   Great Toe Extension :  70 deg    Hip - decreased extension       Strength:  Hip flexors  4/5 4/5  Quadriceps  5-/5 5-/5      Hamstrings  5-/5 5-/5     Anterior Tibialis 5-/5 5-/5     Peroneals  5-/5 5-/5     Hip External rotators 4/5 4/5     Gluteus Medius 4/5 4/5     Gluteus Jose 4/5 4/5       Special Test:    PKB     mild increased pain   Larry test:  Positive  Slump Test:  Negative  SLR Test:  Negative   Quadrant:  Pain left  Lumbar Ext/rotation Positive            PIVM Lumbar: relative hypermobility lower LSp      Palpation: tender through plantar fascia , particularly at insertion, tender to palpation of Achilles bilaterally    Tender in lower lumbar paraspinals  With increased tension in same     TREATMENT   Treatment Time In: 7:55 am   Treatment Time Out: 9:00 am   Total Treatment time separate from Evaluation: 25 minutes    Steven received therapeutic exercises to develop strength and ROM for 10 minutes including:  Heel raises with hold ( ball in heels for inversion moment)   Prayer stretch   Psoas stretch ( larry test style)     Steven received the following manual therapy techniques: Joint mobilizations were applied to the: ankle and lower back for 0  minutes, including:  -    Steven received the following supervised modalities after being cleared for contradictions: IFC Electrical Stimulation:  Steven received IFC Electrical Stimulation for pain control applied to the lower back . Pt received stimulation at 40 % scan at a frequency of 150Hz for 15 minutes. Steven tolderated treatment well without any adverse effects.      Steven received hot pack for 15 minutes to lumbar spine with heat.    Home Exercises and Patient Education Provided    Education provided:   -Education on condition, HEP, and plan of care     Written Home Exercises Provided: yes.  Exercises were reviewed and Steven was able to demonstrate them prior to the end of the session.  Steven demonstrated good  understanding of the education provided.     See EMR under Patient Instructions for exercises provided 11/27/2020.    Assessment   Steven is a 30 y.o. male referred to outpatient Physical Therapy with a medical diagnosis of acute lower back pain and leg pain. He is also being seen for plantar foot pain. Pt presents with severe pes planus leading to increased stress on the structures / tissues of the foot and recently started wearing inserts to address such. The change in inserts/ footwear may have contributed to some increased back pain. He has some hip flexor tightness and increased mobility at the lowest levels of the lumbar spine with a likely extension rotation syndrome / increased stress at the lumbosacral junction. There are no signs of true radiculopathy.     Pt prognosis is Good.   Pt will benefit from skilled outpatient Physical Therapy to address the deficits stated above and in the chart below, provide pt/family education, and to maximize pt's level of independence.     Plan of care discussed with patient: Yes  Pt's spiritual, cultural and educational needs considered and patient is agreeable to the plan of care and goals as stated below:     Anticipated Barriers for therapy: chronicity and  severity of pes planus     Medical Necessity is demonstrated by the following  History  Co-morbidities and personal factors that may impact the plan of care Co-morbidities:   level of undertstanding of current condition and pes planus     Personal Factors:   age     low   Examination  Body Structures and Functions, activity limitations and participation restrictions that may impact the plan of care Body Regions:   back  lower extremities    Body Systems:    ROM  strength  balance  gait    Participation Restrictions:   moderate    Activity limitations:   Learning and applying knowledge  no deficits    General Tasks and Commands  no deficits    Communication  no deficits    Mobility  lifting and carrying objects  walking    Self care  looking after one's health    Domestic Life  shopping  cooking  doing house work (cleaning house, washing dishes, laundry)    Interactions/Relationships  no deficits    Life Areas  employment    Community and Social Life  community life  recreation and leisure         low   Clinical Presentation evolving clinical presentation with changing clinical characteristics moderate   Decision Making/ Complexity Score: low     Goals:  Short Term Goals: In 4 weeks   1.I with HEP  2.Pt to increase lumbar ROM from current to full    3. Pt to report a subjective decrease in pain   4. Patient to demo increase ankle dorsiflexion ROM to 5 degrees active     Long Term Goals: In 10 weeks  1.Pt to score less than 20% impaired on the FOTO questionnaire  2. Patient to demo increase in LE strength to 5- or 5/5  3. Patient to have decreased pain to <1/10 at all times.  4. Patient to obtain orthotics that are comfortable and provide support to longitudinal arch  5. Patient to perform daily activities including ambulating community distances without limitation.      Plan   Plan of care Certification: 11/27/2020 to 2/5/2021    Outpatient Physical Therapy 2 times weekly for 10 weeks to include the following  interventions: Electrical Stimulation prn, Gait Training, Manual Therapy, Moist Heat/ Ice, Neuromuscular Re-ed, Patient Education, Self Care, Therapeutic Activites and Therapeutic Exercise.     Huang Ramirez, PT    Thank you for this referral.    These services are reasonable and necessary for the conditions set forth above while under my care.

## 2020-12-04 ENCOUNTER — TELEPHONE (OUTPATIENT)
Dept: INTERNAL MEDICINE | Facility: CLINIC | Age: 30
End: 2020-12-04

## 2020-12-04 ENCOUNTER — CLINICAL SUPPORT (OUTPATIENT)
Dept: REHABILITATION | Facility: HOSPITAL | Age: 30
End: 2020-12-04
Payer: COMMERCIAL

## 2020-12-04 DIAGNOSIS — M79.605 BILATERAL LEG PAIN: ICD-10-CM

## 2020-12-04 DIAGNOSIS — M79.604 BILATERAL LEG PAIN: ICD-10-CM

## 2020-12-04 DIAGNOSIS — M54.50 ACUTE LOW BACK PAIN, UNSPECIFIED BACK PAIN LATERALITY, UNSPECIFIED WHETHER SCIATICA PRESENT: Primary | ICD-10-CM

## 2020-12-04 DIAGNOSIS — M72.2 PLANTAR FASCIITIS, BILATERAL: ICD-10-CM

## 2020-12-04 PROCEDURE — 97110 THERAPEUTIC EXERCISES: CPT

## 2020-12-04 PROCEDURE — 97140 MANUAL THERAPY 1/> REGIONS: CPT

## 2020-12-04 NOTE — PROGRESS NOTES
Physical Therapy Treatment Note     Name: Steven Thompson Jr.  Clinic Number: [id number]    Therapy Diagnosis:   Encounter Diagnoses   Name Primary?    Acute low back pain, unspecified back pain laterality, unspecified whether sciatica present Yes    Bilateral leg pain     Plantar fasciitis, bilateral      Physician: Nusrat Carrillo MD    Visit Date: 12/4/2020    Physician Orders: PT Eval and Treat    Medical Diagnosis from Referral:   M54.5 (ICD-10-CM) - Acute low back pain, unspecified back pain laterality, unspecified whether sciatica present   M79.604,M79.605 (ICD-10-CM) - Bilateral leg pain   Evaluation Date: 11/27/2020  Authorization Period Expiration: 12/31/2020  Plan of Care Expiration: 2/5/2021  Visit # / Visits authorized: 2 / 20     Time In: 7:55 am  Time Out: 8:50 am   Total Billable Time: 50 minutes     Precautions: Standard       Subjective     Pt reports: he tolerated the evaluation well. Some soreness with exercises but not too bad. Still with tenderness and pain in plantar foot , described as worst around plantar fascial insertion. Worse in am and generally with weight bearing . He reports he is concerned with going back to longer hours working and standing in boots. He does have inserts that he is wearing but wonders if they are the right ones. Is not wearing today during visit.   He was compliant with home exercise program.  Response to previous treatment: good  Functional change: none to date    Pain: 2/10  Location: bilateral plantar feet       Objective     Steven received therapeutic exercises to develop strength and ROM for 25 minutes including:  Heel raises with hold ( ball in heels for inversion moment)   Seated soleus heel raise - 40# 3 x 15   Shuttle squats 6 bands 2 x 2 mins   Shuttle heel raise with hold 2 x 2 mins   gastroc and great toe stretch - passive   Psoas stretch ( rommel test style)      Steven received the following manual therapy techniques: Joint mobilizations  were applied to the: ankle and lower back for 20 minutes, including:  -talocrural distraction and talar posterior glide   - great toe extension mobs            Home Exercises Provided and Patient Education Provided     Education provided:   - reviewed / discussed plan of care and exercises . Discussed orthotics and use of such to assist in unloading tissue as well as discussed the pathology of plantar fasciitis and treatment approaches for such.     Written Home Exercises Provided: Patient instructed to cont prior HEP.  Exercises were reviewed and Steven was able to demonstrate them prior to the end of the session.  Steven demonstrated good  understanding of the education provided.     See EMR under Patient Instructions for exercises provided prior visit.    Assessment     Tolerated progression of loading exercise well with good effort but reports some pain with such. Pain subsides quickly after exercise.   Notable pes planus and hip flexor tightness and will benefit from continued progression of exercise to address such. Patient instructed to bring inserts next session for PT to evaluate and recommendations will be made at that time. Regardless of inserts he will need to increase intrinsic foot strength .   Steven is progressing well towards his goals.   Pt prognosis is Good.     Pt will continue to benefit from skilled outpatient physical therapy to address the deficits listed in the problem list box on initial evaluation, provide pt/family education and to maximize pt's level of independence in the home and community environment.     Pt's spiritual, cultural and educational needs considered and pt agreeable to plan of care and goals.     Anticipated barriers to physical therapy: chronicity and severity of pes planus    Goals:   Short Term Goals: In 4 weeks   1.I with HEP  2.Pt to increase lumbar ROM from current to full    3. Pt to report a subjective decrease in pain   4. Patient to demo increase ankle dorsiflexion  ROM to 5 degrees active      Long Term Goals: In 10 weeks  1.Pt to score less than 20% impaired on the FOTO questionnaire  2. Patient to demo increase in LE strength to 5- or 5/5  3. Patient to have decreased pain to <1/10 at all times.  4. Patient to obtain orthotics that are comfortable and provide support to longitudinal arch  5. Patient to perform daily activities including ambulating community distances without limitation.        Plan   Plan of care Certification: 11/27/2020 to 2/5/2021     Outpatient Physical Therapy 2 times weekly for 10 weeks to include the following interventions: Electrical Stimulation prn, Gait Training, Manual Therapy, Moist Heat/ Ice, Neuromuscular Re-ed, Patient Education, Self Care, Therapeutic Activites and Therapeutic Exercise.       Huang Ramirez, PT

## 2020-12-07 NOTE — PROGRESS NOTES
"  Physical Therapy Assistant Treatment Note     Name: Steven Thompson Jr.  Clinic Number: [id number]    Therapy Diagnosis:   Encounter Diagnoses   Name Primary?    Acute low back pain, unspecified back pain laterality, unspecified whether sciatica present Yes    Bilateral leg pain     Plantar fasciitis, bilateral      Physician: Nusrat Carrillo MD    Visit Date: 12/8/2020    Physician Orders: PT Eval and Treat    Medical Diagnosis from Referral:   M54.5 (ICD-10-CM) - Acute low back pain, unspecified back pain laterality, unspecified whether sciatica present   M79.604,M79.605 (ICD-10-CM) - Bilateral leg pain   Evaluation Date: 11/27/2020  Authorization Period Expiration: 12/31/2020  Plan of Care Expiration: 2/5/2021  Visit # / Visits authorized: 3 / 20     Time In: 7:35 am  Time Out: 8:20 am   Total Billable Time: 45 minutes     Precautions: Standard       Subjective     Pt reports: he has increased pain in low back and B feet worsening in the morning. He has been doing stretches prior to getting out of bed. He thinks shoe inserts have been contributing to increased low back pain. He will go back to work next Monday and will be standing and walking for 11 hours.  He was compliant with home exercise program.  Response to previous treatment: good   Functional change: none to date    Pain: 9/10  Location: bilateral plantar feet       Objective     Steven received therapeutic exercises to develop strength and ROM for 45 minutes including:  Heel raises with hold ( ball in heels for inversion moment)   Seated soleus heel raise - 40# 3 x 15   Shuttle squats 6 bands 2 x 2 mins   Shuttle heel raise with hold 2 x 2 mins   gastroc and soleus stretch 3x30"  Psoas stretch ( rommel test style)  Bridges 3x10  Side-lying SLR 2x10     Steven received the following manual therapy techniques: Joint mobilizations were applied to the: ankle and lower back for 0 minutes, including:  -talocrural distraction and talar posterior " glide   - great toe extension mobs      Steven received the following supervised modalities after being cleared for contradictions: IFC Electrical Stimulation:  Steven received IFC Electrical Stimulation for pain control applied to the lower back . Pt received stimulation at 40 % scan at a frequency of 150Hz for 10 minutes.        Steven received hot pack for 10 minutes to lumbar spine with heat.      Home Exercises Provided and Patient Education Provided     Education provided:   - reviewed / discussed plan of care and exercises . Discussed orthotics and use of such to assist in unloading tissue as well as discussed the pathology of plantar fasciitis and treatment approaches for such.    Written Home Exercises Provided: Patient instructed to cont prior HEP.  Exercises were reviewed and Steven was able to demonstrate them prior to the end of the session.  Steven demonstrated good  understanding of the education provided.     See EMR under Patient Instructions for exercises provided prior visit.    Assessment     Patient presents with reports of increased low back and bilateral foot pain. Performed additional hip strengthening exercises to increase core stability. He responded well to new exercises but difficulty with glut med strengthening. PT evaluated shoe inserts and made recommendations to less rigid insert. He reports decreased pain following exercise and modalities.    Steven is progressing well towards his goals.   Pt prognosis is Good.     Pt will continue to benefit from skilled outpatient physical therapy to address the deficits listed in the problem list box on initial evaluation, provide pt/family education and to maximize pt's level of independence in the home and community environment.     Pt's spiritual, cultural and educational needs considered and pt agreeable to plan of care and goals.     Anticipated barriers to physical therapy: chronicity and severity of pes planus    Goals:   Short Term Goals: In 4  weeks   1.I with HEP  2.Pt to increase lumbar ROM from current to full    3. Pt to report a subjective decrease in pain   4. Patient to demo increase ankle dorsiflexion ROM to 5 degrees active      Long Term Goals: In 10 weeks  1.Pt to score less than 20% impaired on the FOTO questionnaire  2. Patient to demo increase in LE strength to 5- or 5/5  3. Patient to have decreased pain to <1/10 at all times.  4. Patient to obtain orthotics that are comfortable and provide support to longitudinal arch  5. Patient to perform daily activities including ambulating community distances without limitation.        Plan   Plan of care Certification: 11/27/2020 to 2/5/2021     Outpatient Physical Therapy 2 times weekly for 10 weeks to include the following interventions: Electrical Stimulation prn, Gait Training, Manual Therapy, Moist Heat/ Ice, Neuromuscular Re-ed, Patient Education, Self Care, Therapeutic Activites and Therapeutic Exercise.       Elizabeth Lyles, PTA

## 2020-12-08 ENCOUNTER — CLINICAL SUPPORT (OUTPATIENT)
Dept: REHABILITATION | Facility: HOSPITAL | Age: 30
End: 2020-12-08
Payer: COMMERCIAL

## 2020-12-08 DIAGNOSIS — M79.605 BILATERAL LEG PAIN: ICD-10-CM

## 2020-12-08 DIAGNOSIS — M79.604 BILATERAL LEG PAIN: ICD-10-CM

## 2020-12-08 DIAGNOSIS — M72.2 PLANTAR FASCIITIS, BILATERAL: ICD-10-CM

## 2020-12-08 DIAGNOSIS — M54.50 ACUTE LOW BACK PAIN, UNSPECIFIED BACK PAIN LATERALITY, UNSPECIFIED WHETHER SCIATICA PRESENT: Primary | ICD-10-CM

## 2020-12-08 PROCEDURE — 97014 ELECTRIC STIMULATION THERAPY: CPT | Mod: CQ

## 2020-12-08 PROCEDURE — 97110 THERAPEUTIC EXERCISES: CPT | Mod: CQ

## 2020-12-09 ENCOUNTER — PATIENT OUTREACH (OUTPATIENT)
Dept: ADMINISTRATIVE | Facility: OTHER | Age: 30
End: 2020-12-09

## 2020-12-09 ENCOUNTER — OFFICE VISIT (OUTPATIENT)
Dept: PODIATRY | Facility: CLINIC | Age: 30
End: 2020-12-09
Payer: COMMERCIAL

## 2020-12-09 VITALS — BODY MASS INDEX: 26.42 KG/M2 | HEIGHT: 69 IN | WEIGHT: 178.38 LBS

## 2020-12-09 DIAGNOSIS — M72.2 PLANTAR FASCIITIS: ICD-10-CM

## 2020-12-09 DIAGNOSIS — M21.41 PES PLANUS OF BOTH FEET: ICD-10-CM

## 2020-12-09 DIAGNOSIS — M21.42 PES PLANUS OF BOTH FEET: ICD-10-CM

## 2020-12-09 DIAGNOSIS — M54.50 ACUTE BILATERAL LOW BACK PAIN, UNSPECIFIED WHETHER SCIATICA PRESENT: ICD-10-CM

## 2020-12-09 DIAGNOSIS — M76.61 ACHILLES TENDINITIS OF RIGHT LOWER EXTREMITY: Primary | ICD-10-CM

## 2020-12-09 PROCEDURE — 1125F AMNT PAIN NOTED PAIN PRSNT: CPT | Mod: S$GLB,,, | Performed by: PODIATRIST

## 2020-12-09 PROCEDURE — 1125F PR PAIN SEVERITY QUANTIFIED, PAIN PRESENT: ICD-10-PCS | Mod: S$GLB,,, | Performed by: PODIATRIST

## 2020-12-09 PROCEDURE — 99999 PR PBB SHADOW E&M-EST. PATIENT-LVL II: CPT | Mod: PBBFAC,,, | Performed by: PODIATRIST

## 2020-12-09 PROCEDURE — 99999 PR PBB SHADOW E&M-EST. PATIENT-LVL II: ICD-10-PCS | Mod: PBBFAC,,, | Performed by: PODIATRIST

## 2020-12-09 PROCEDURE — 99214 OFFICE O/P EST MOD 30 MIN: CPT | Mod: S$GLB,,, | Performed by: PODIATRIST

## 2020-12-09 PROCEDURE — 3008F BODY MASS INDEX DOCD: CPT | Mod: CPTII,S$GLB,, | Performed by: PODIATRIST

## 2020-12-09 PROCEDURE — 3008F PR BODY MASS INDEX (BMI) DOCUMENTED: ICD-10-PCS | Mod: CPTII,S$GLB,, | Performed by: PODIATRIST

## 2020-12-09 PROCEDURE — 99214 PR OFFICE/OUTPT VISIT, EST, LEVL IV, 30-39 MIN: ICD-10-PCS | Mod: S$GLB,,, | Performed by: PODIATRIST

## 2020-12-09 RX ORDER — IBUPROFEN 600 MG/1
600 TABLET ORAL 2 TIMES DAILY
Qty: 60 TABLET | Refills: 0 | Status: SHIPPED | OUTPATIENT
Start: 2020-12-09 | End: 2021-01-05 | Stop reason: SDUPTHER

## 2020-12-09 NOTE — LETTER
December 9, 2020      [facility]  [address]  [place] [address]  Phone: [phone]  Fax: [phone]       Patient: Steven Thompson   YOB: 1990  Date of Visit: 12/09/2020    To Whom It May Concern:    Ashley Thompson  was at [facility] on 12/09/2020. Please excuse patient from work on 12- through 1-4-2021 while he continues with physical therapy. If you have any questions or concerns, or if I can be of further assistance, please do not hesitate to contact me.    Sincerely,    Ivy Adam DPM

## 2020-12-09 NOTE — PROGRESS NOTES
Subjective:       Patient ID: Steven Thompson Jr. is a 30 y.o. male.    Chief Complaint: Follow-up (PTTD f/u. Pt states pain rating 9/10. Pt states he has been going to physical therapy and using bands and inserts as recommended. Pt states pain is constant.)      HPI: Steven Thompson Jr. presents to clinic today for 6 week evaluation of posterior tibial tendon dysfunction.  States he has utilize orthotics but has not been significantly helpful.  Has been going to physical therapy at the recommendation of his primary care physician in conjunction with low back pain as well which is new.  States he is also follow with chiropractics who did some alignment and recommended therapy as well.  States that he is in 9/10 pain and has been off work for approximately 14-21 days.  States that he is considering new inserts but would like to heal this with physical therapy. Patient's Primary Care Provider is Nusrat Carrillo MD.     Review of patient's allergies indicates:  No Known Allergies    History reviewed. No pertinent past medical history.    History reviewed. No pertinent family history.    Social History     Socioeconomic History    Marital status: Single     Spouse name: Not on file    Number of children: Not on file    Years of education: Not on file    Highest education level: Not on file   Occupational History    Not on file   Social Needs    Financial resource strain: Not on file    Food insecurity     Worry: Not on file     Inability: Not on file    Transportation needs     Medical: Not on file     Non-medical: Not on file   Tobacco Use    Smoking status: Current Every Day Smoker     Types: Cigarettes    Smokeless tobacco: Never Used   Substance and Sexual Activity    Alcohol use: Not Currently    Drug use: Not Currently    Sexual activity: Yes     Partners: Female   Lifestyle    Physical activity     Days per week: Not on file     Minutes per session: Not on file    Stress: Only a  "little   Relationships    Social connections     Talks on phone: Not on file     Gets together: Not on file     Attends Congregation service: Not on file     Active member of club or organization: Not on file     Attends meetings of clubs or organizations: Not on file     Relationship status: Not on file   Other Topics Concern    Not on file   Social History Narrative    Not on file       History reviewed. No pertinent surgical history.    Review of Systems   Constitutional: Negative for activity change, appetite change, chills and fever.   HENT: Negative for sinus pain, sore throat and voice change.    Eyes: Negative for pain, redness and visual disturbance.   Respiratory: Negative for cough and shortness of breath.    Cardiovascular: Negative for chest pain and palpitations.   Gastrointestinal: Negative for diarrhea, nausea and vomiting.   Musculoskeletal: Positive for gait problem. Negative for back pain and joint swelling.   Skin: Negative for color change and wound.   Neurological: Negative for dizziness, weakness and numbness.   Psychiatric/Behavioral: The patient is not nervous/anxious.          Objective:   Ht 5' 9" (1.753 m)   Wt 80.9 kg (178 lb 5.6 oz)   BMI 26.34 kg/m²     X-Ray Foot Complete 3 view Right  Narrative: EXAMINATION:  XR FOOT COMPLETE 3 VIEW RIGHT    CLINICAL HISTORY:  . Achilles tendinitis, unspecified leg    TECHNIQUE:  AP, lateral, and oblique views of the foot were performed.    COMPARISON:  None    FINDINGS:  There is no evidence to suggest acute fracture or dislocation.  The joint spaces appear to be well maintained.  A pes planus deformity is suspected even though the patient is nonweightbearing.  Impression: As above    Electronically signed by: Saul Montalvo DO  Date:    11/09/2020  Time:    11:02      Physical Exam  LOWER EXTREMITY PHYSICAL EXAMINATION    VASCULAR: The right DP pulse is 2/4 and the left DP is 2/4. The right PT pulse is 2/4 and the left PT pulse is 2/4. Proximal " to distal, warm to warm. No dependent rubor or elevation palor is noted. Capillary refill time is less than 3 seconds. Hair growth is appreciated to the dorsal foot and digits.    NEUROLOGY: Proprioception is intact, bilateral. Sensation to light touch is intact. Negative Tinel's Sign and negative Valleix Sign. No neurological sensations with compression of the area of Sotelo's Nerve in the area of the Abductor Hallucis muscle belly.    ORTHOPEDIC:  Moderate to severe tenderness to palpation of the area around the plantar medial calcaneal tubercle on the right and left foot. Pains are characterized as sharp and stabbing-like with direct palpation of the area. There is also mild pain to palpation of the immediate plantar aspect of the heel, and no pains to the lateral band of the fascia. No edema is noted. No fullness is noted. No pains or defects are noted within the plantar fascia at the arch. No plantar fibromas are noted. No defects are noted within the ligament. Dorsiflexion of the MTPJs with simultaneous palpation of the fascia at the arch, does worsen and exacerbate the pains.  There is pain on palpation to the posterior aspect of the right calcaneus at the insertion of the Achilles tendon.  There is no fusiform fibrosis or thickening of the tendon.  No defects within the tendon.  Manual muscle strength testing 5/5 bilaterally.  Pes planus foot deformity with loss of the medial longitudinal arch bilaterally.  Heel is everted in resting calcaneal stance position.      DERMATOLOGY: No ecchymosis is noted.  Skin is supple, dry and intact. Skin is supple.  No hyperkeratosis noted. No calluses.  No open wounds or ulcerations are noted.  No palpable plantar fibromas noted.    Assessment:     1. Achilles tendinitis of right lower extremity    2. Plantar fasciitis    3. Pes planus of both feet    4. Acute bilateral low back pain, unspecified whether sciatica present          Plan:     Achilles tendinitis of right  lower extremity    Plantar fasciitis    Pes planus of both feet    Acute bilateral low back pain, unspecified whether sciatica present    Other orders  -     ibuprofen (ADVIL,MOTRIN) 600 MG tablet; Take 1 tablet (600 mg total) by mouth 2 (two) times daily.  Dispense: 60 tablet; Refill: 0      X-rays were once again reviewed by myself with the patient the room.  Patient does significant bilateral pes planus foot deformity and subsequently developed plantar fasciitis.  Currently no pain along the posterior tibial tendon secondary to pes planus foot deformity.  As patient has had developed plantar fasciitis I did discussed treatment option regarding plantar fasciitis.  Discussed utilizing the inserts or utilizing other opportunities for orthotics.  Discussed the importance of stretching exercises.  Patient no longer taking meloxicam and request refill for pain medication.  I did discuss ibuprofen as this will be able to be taken twice daily which can be increased to 3 times per day.    Patient to continue with physical therapy.    Did discussed possibility of steroid injection to the plantar heel to help with acute inflammation of plantar fasciitis.  Patient requested to continue with physical therapy and hold on injection at this time.    A work note was provided to the patient his request to be offered additional 2 weeks following his completion of the original order per PCP.            Future Appointments   Date Time Provider Department Center   12/11/2020  8:00 AM MARLY Salas

## 2020-12-09 NOTE — PROGRESS NOTES
Health Maintenance Due   Topic Date Due    Hepatitis C Screening  1990    HIV Screening  03/27/2003    TETANUS VACCINE  03/27/2006    Pneumococcal Vaccine (Medium Risk) (1 of 1 - PPSV23) 03/27/2007    Influenza Vaccine (1) 08/01/2020     Updates were requested from care everywhere.  Chart was reviewed for overdue Proactive [facility] ([facility]) topics (CRS, Breast Cancer Screening, Eye exam)  Health Maintenance has been updated.  LINKS immunization registry triggered.  LINKS not responding.

## 2020-12-14 ENCOUNTER — HOSPITAL ENCOUNTER (EMERGENCY)
Facility: HOSPITAL | Age: 30
Discharge: HOME | End: 2020-12-14
Attending: EMERGENCY MEDICINE
Payer: COMMERCIAL

## 2020-12-14 VITALS
HEART RATE: 90 BPM | SYSTOLIC BLOOD PRESSURE: 130 MMHG | BODY MASS INDEX: 27.26 KG/M2 | OXYGEN SATURATION: 96 % | RESPIRATION RATE: 15 BRPM | HEIGHT: 69 IN | DIASTOLIC BLOOD PRESSURE: 72 MMHG | TEMPERATURE: 98 F | WEIGHT: 184.06 LBS

## 2020-12-14 DIAGNOSIS — S51.811A LACERATION OF RIGHT FOREARM, INITIAL ENCOUNTER: Primary | ICD-10-CM

## 2020-12-14 DIAGNOSIS — Z23 TETANUS-DIPHTHERIA VACCINATION ADMINISTERED AT CURRENT VISIT: ICD-10-CM

## 2020-12-14 PROCEDURE — 90471 IMMUNIZATION ADMIN: CPT | Performed by: NURSE PRACTITIONER

## 2020-12-14 PROCEDURE — 99284 EMERGENCY DEPT VISIT MOD MDM: CPT | Mod: 25

## 2020-12-14 PROCEDURE — 90715 TDAP VACCINE 7 YRS/> IM: CPT | Performed by: NURSE PRACTITIONER

## 2020-12-14 PROCEDURE — 63600175 PHARM REV CODE 636 W HCPCS: Performed by: NURSE PRACTITIONER

## 2020-12-14 RX ADMIN — CLOSTRIDIUM TETANI TOXOID ANTIGEN (FORMALDEHYDE INACTIVATED), CORYNEBACTERIUM DIPHTHERIAE TOXOID ANTIGEN (FORMALDEHYDE INACTIVATED), BORDETELLA PERTUSSIS TOXOID ANTIGEN (GLUTARALDEHYDE INACTIVATED), BORDETELLA PERTUSSIS FILAMENTOUS HEMAGGLUTININ ANTIGEN (FORMALDEHYDE INACTIVATED), BORDETELLA PERTUSSIS PERTACTIN ANTIGEN, AND BORDETELLA PERTUSSIS FIMBRIAE 2/3 ANTIGEN 0.5 ML: 5; 2; 2.5; 5; 3; 5 INJECTION, SUSPENSION INTRAMUSCULAR at 11:12

## 2020-12-15 NOTE — ED PROVIDER NOTES
HISTORY     Chief Complaint   Patient presents with    Laceration     pt got cut around 1500 with a knife on the R wrist. pt doesnt remeber his last tetanus.      Review of patient's allergies indicates:  No Known Allergies     HPI   The history is provided by the patient.   Laceration   The incident occurred 3 to 5 hours ago. The laceration is located on the right arm. The laceration is 4 cm in size. The laceration mechanism was a a metal edge. The pain is at a severity of 2/10. The pain has been constant since onset. He reports no foreign bodies present. His tetanus status is unknown.        PCP: Nusrat Carrillo MD     Past Medical History:  No past medical history on file.     Past Surgical History:  No past surgical history on file.     Family History:  No family history on file.     Social History:  Social History     Tobacco Use    Smoking status: Current Every Day Smoker     Types: Cigarettes    Smokeless tobacco: Never Used   Substance and Sexual Activity    Alcohol use: Not Currently    Drug use: Not Currently    Sexual activity: Yes     Partners: Female         ROS   Review of Systems   Constitutional: Negative for fever.   HENT: Negative for sore throat.    Respiratory: Negative for shortness of breath.    Cardiovascular: Negative for chest pain.   Gastrointestinal: Negative for nausea.   Genitourinary: Negative for dysuria.   Musculoskeletal: Negative for back pain.   Skin: Negative for rash.        Laceration right forearm    Neurological: Negative for weakness.   Hematological: Does not bruise/bleed easily.       PHYSICAL EXAM     Initial Vitals [12/14/20 2246]   BP Pulse Resp Temp SpO2   130/72 90 15 98.4 °F (36.9 °C) 96 %      MAP       --           Physical Exam    Constitutional: He appears well-developed and well-nourished. No distress.   HENT:   Head: Normocephalic and atraumatic.   Eyes: Conjunctivae are normal. Pupils are equal, round, and reactive to light.   Neck: Normal range  "of motion. Neck supple.   Cardiovascular: Normal rate, regular rhythm and normal heart sounds.   Pulmonary/Chest: Breath sounds normal.   Abdominal: Soft. Bowel sounds are normal.   Musculoskeletal: Normal range of motion.   Neurological: He is alert and oriented to person, place, and time. No cranial nerve deficit.   Skin: Skin is warm and dry.        Psychiatric: He has a normal mood and affect.          ED COURSE   Lac Repair    Date/Time: 12/14/2020 11:50 PM  Performed by: Ced Baca NP  Authorized by: Hermes Boyle MD     Laceration details:     Location:  Shoulder/arm    Shoulder/arm location:  R lower arm    Length (cm):  3    Depth (mm):  1  Repair type:     Repair type:  Simple  Pre-procedure details:     Preparation:  Patient was prepped and draped in usual sterile fashion  Exploration:     Hemostasis achieved with:  Direct pressure    Contaminated: no    Treatment:     Area cleansed with:  Betadine    Amount of cleaning:  Standard    Irrigation solution:  Sterile water    Irrigation method:  Syringe  Skin repair:     Repair method:  Steri-Strips    Number of Steri-Strips:  5  Approximation:     Approximation:  Close  Post-procedure details:     Dressing:  Non-adherent dressing    Patient tolerance of procedure:  Tolerated well, no immediate complications      ED ONGOING VITALS:  Vitals:    12/14/20 2246   BP: 130/72   Pulse: 90   Resp: 15   Temp: 98.4 °F (36.9 °C)   TempSrc: Oral   SpO2: 96%   Weight: 83.5 kg (184 lb 1.4 oz)   Height: 5' 9" (1.753 m)         ABNORMAL LAB VALUES:  Labs Reviewed - No data to display      ALL LAB VALUES:        RADIOLOGY STUDIES:  Imaging Results    None                   The above vital signs and test results have been reviewed by the emergency provider.     ED Medications:  Discharge Medication List as of 12/14/2020 11:02 PM        Discharge Medications:  Discharge Medication List as of 12/14/2020 11:02 PM         Follow-up Information     Nusrat Carrillo MD. "    Specialty: Internal Medicine  Contact information:  [address] [facility] [address]  [phone]             [facility] .    Specialty: Emergency Medicine  Why: As needed, If symptoms worsen  Contact information:  [address]  [facility] [address] [phone]           [facility]    Specialty: Emergency Medicine  Why: As needed, If symptoms worsen  Contact information:  [address] [facility] [address] [phone]                I discussed with patient and/or family/caretaker that evaluation in the ED does not suggest any emergent or life threatening medical conditions requiring immediate intervention beyond what was provided in the ED, and I believe patient is safe for discharge. Regardless, an unremarkable evaluation in the ED does not preclude the development or presence of a serious or life threatening condition. As such, patient was instructed to return immediately for any worsening or change in current symptoms.    I discussed wound care precautions with patient and/or family/caretaker; specifically that all wounds have risk of infection despite efforts to cleanse and debride the wound; and there is a risk of an occult foreign body (and thus increased risk of infection) despite a negative examination.  I discussed with patient need to return for any signs of infection, specifically redness, increased pain, fever, drainage of pus, or any concern, immediately.      MEDICAL DECISION MAKING                 CLINICAL IMPRESSION       ICD-10-CM ICD-9-CM   1. Laceration of right forearm, initial encounter  S51.811A 881.00   2. Tetanus-diphtheria vaccination administered at current visit  Z23 V49.89       Disposition:   Disposition: Discharged  Condition: Stable         Ced Baca NP  12/15/20 0152

## 2022-06-01 ENCOUNTER — PATIENT OUTREACH (OUTPATIENT)
Dept: ADMINISTRATIVE | Facility: HOSPITAL | Age: 32
End: 2022-06-01
Payer: COMMERCIAL